# Patient Record
Sex: MALE | Race: WHITE | NOT HISPANIC OR LATINO | URBAN - METROPOLITAN AREA
[De-identification: names, ages, dates, MRNs, and addresses within clinical notes are randomized per-mention and may not be internally consistent; named-entity substitution may affect disease eponyms.]

---

## 2024-05-22 PROBLEM — S39.012A LUMBAR STRAIN: Status: ACTIVE | Noted: 2024-05-22

## 2024-05-22 PROBLEM — M54.9 DISCOGENIC PAIN: Status: ACTIVE | Noted: 2024-05-22

## 2024-05-22 PROBLEM — L40.9 PSORIASIS: Status: ACTIVE | Noted: 2024-05-22

## 2024-05-22 PROBLEM — B35.1 ONYCHOMYCOSIS: Status: ACTIVE | Noted: 2024-05-22

## 2024-05-22 PROBLEM — M54.42 ACUTE LEFT-SIDED LOW BACK PAIN WITH LEFT-SIDED SCIATICA: Status: ACTIVE | Noted: 2024-05-22

## 2024-05-22 PROBLEM — M67.439 GANGLION OF WRIST: Status: ACTIVE | Noted: 2024-05-22

## 2024-05-24 ENCOUNTER — APPOINTMENT (OUTPATIENT)
Dept: PRIMARY CARE | Facility: CLINIC | Age: 44
End: 2024-05-24
Payer: COMMERCIAL

## 2024-06-25 RX ORDER — APREMILAST 30 MG/1
30 TABLET, FILM COATED ORAL 2 TIMES DAILY
COMMUNITY
End: 2024-06-26 | Stop reason: WASHOUT

## 2024-06-26 ENCOUNTER — APPOINTMENT (OUTPATIENT)
Dept: PRIMARY CARE | Facility: CLINIC | Age: 44
End: 2024-06-26
Payer: COMMERCIAL

## 2024-06-26 VITALS
DIASTOLIC BLOOD PRESSURE: 72 MMHG | HEART RATE: 109 BPM | SYSTOLIC BLOOD PRESSURE: 140 MMHG | TEMPERATURE: 98 F | BODY MASS INDEX: 32.58 KG/M2 | WEIGHT: 220 LBS | HEIGHT: 69 IN

## 2024-06-26 DIAGNOSIS — M79.89 SOFT TISSUE MASS: Primary | ICD-10-CM

## 2024-06-26 PROCEDURE — 99213 OFFICE O/P EST LOW 20 MIN: CPT | Performed by: FAMILY MEDICINE

## 2024-06-26 RX ORDER — LIDOCAINE 50 MG/G
PATCH TOPICAL
COMMUNITY
Start: 2022-08-02 | End: 2024-06-26 | Stop reason: WASHOUT

## 2024-06-26 RX ORDER — METHOTREXATE 2.5 MG/1
TABLET ORAL
COMMUNITY
Start: 2024-05-28 | End: 2024-06-26 | Stop reason: WASHOUT

## 2024-06-26 RX ORDER — IXEKIZUMAB 80 MG/ML
INJECTION, SOLUTION SUBCUTANEOUS
COMMUNITY
Start: 2024-06-10

## 2024-06-26 RX ORDER — FLUOCINOLONE ACETONIDE 0.11 MG/ML
OIL AURICULAR (OTIC)
COMMUNITY
Start: 2023-10-05 | End: 2024-06-26 | Stop reason: WASHOUT

## 2024-06-26 RX ORDER — CLOBETASOL PROPIONATE 0.5 MG/G
OINTMENT TOPICAL
COMMUNITY
Start: 2023-10-05 | End: 2024-06-26 | Stop reason: WASHOUT

## 2024-06-26 RX ORDER — FOLIC ACID 1 MG/1
1 TABLET ORAL
COMMUNITY
Start: 2023-12-08 | End: 2024-06-26 | Stop reason: WASHOUT

## 2024-06-26 RX ORDER — TAPINAROF 10 MG/1000MG
CREAM TOPICAL
COMMUNITY
Start: 2023-10-06 | End: 2024-06-26 | Stop reason: WASHOUT

## 2024-06-26 RX ORDER — IBUPROFEN 800 MG/1
TABLET ORAL
COMMUNITY
Start: 2022-08-02 | End: 2024-06-26 | Stop reason: WASHOUT

## 2024-06-26 ASSESSMENT — PATIENT HEALTH QUESTIONNAIRE - PHQ9
1. LITTLE INTEREST OR PLEASURE IN DOING THINGS: NOT AT ALL
SUM OF ALL RESPONSES TO PHQ9 QUESTIONS 1 AND 2: 0
2. FEELING DOWN, DEPRESSED OR HOPELESS: NOT AT ALL

## 2024-06-26 NOTE — PROGRESS NOTES
"    /72   Pulse 109   Temp 36.7 °C (98 °F)   Ht 1.753 m (5' 9\")   Wt 99.8 kg (220 lb)   BMI 32.49 kg/m²     Past Medical History:   Diagnosis Date    Contact with and (suspected) exposure to covid-19     Exposure to COVID-19 virus    Contact with and (suspected) exposure to covid-19 12/19/2021    Encounter for laboratory testing for COVID-19 virus    Other specified health status     Patient denies medical problems    Personal history of other specified conditions 12/19/2021    History of nasal congestion       Patient Active Problem List   Diagnosis    Acute left-sided low back pain with left-sided sciatica    Discogenic pain    Ganglion of wrist    Lumbar strain    Onychomycosis    Psoriasis       Current Outpatient Medications   Medication Sig Dispense Refill    Taltz Autoinjector 80 mg/mL injection inject 80 mg Subcutaneous every 4 weeks for 84 days      clobetasol (Temovate) 0.05 % ointment APPLY THIN LAYER TO AFFECTED AREA 2X/DAY FOR ITCHING AS NEEDED.      fluocinolone (DermOtic) 0.01 % ear drops INSTILL 5 DROPS INTO AFFECTED EAR TWICE A DAY FOR 7 DAYS      folic acid (Folvite) 1 mg tablet Take 1 tablet (1 mg) by mouth early in the morning..      ibuprofen 800 mg tablet Take by mouth.      lidocaine (Lidoderm) 5 % patch APPLY 1 PATCH TO THE AFFECTED AREA AND LEAVE IN PLACE FOR 12 HOURS, THEN REMOVE AND LEAVE OFF FOR 12 HOURS.      methotrexate (Trexall) 2.5 mg tablet TAKE 8 TABLETS ORALLY ONCE A WEEK FOR 90 DAYS      Otezla 30 mg tablet Take 1 tablet (30 mg) by mouth 2 times a day.      Vtama 1 % cream        No current facility-administered medications for this visit.       CC/HPI/ASSESSMENT/PLAN    CC bump on chest    HPI patient 43-year-old male notes he has a lump on his midline in the lower rib cage area.  He notes the lump is gotten bigger and is somewhat painful.  He like evaluated.  Denies fever chills shortness of breath headache rash.    Exam calm neck supple lungs CTA CV RRR.  " Examination of the chest wall reveals a palpable lump in the midline of the chest in the lower rib cage region most likely a cyst    A/P 1 soft tissue mass.  X-ray and ultrasound ordered.  Will recommend further workup based on these test results.  Patient agreeable to plan.    There are no diagnoses linked to this encounter.

## 2024-06-28 ENCOUNTER — HOSPITAL ENCOUNTER (OUTPATIENT)
Dept: RADIOLOGY | Facility: CLINIC | Age: 44
Discharge: HOME | End: 2024-06-28
Payer: COMMERCIAL

## 2024-06-28 DIAGNOSIS — M79.89 SOFT TISSUE MASS: ICD-10-CM

## 2024-06-28 PROCEDURE — 74022 RADEX COMPL AQT ABD SERIES: CPT

## 2024-06-28 PROCEDURE — 76705 ECHO EXAM OF ABDOMEN: CPT

## 2024-07-01 ENCOUNTER — TELEPHONE (OUTPATIENT)
Dept: PRIMARY CARE | Facility: CLINIC | Age: 44
End: 2024-07-01
Payer: COMMERCIAL

## 2024-07-01 NOTE — TELEPHONE ENCOUNTER
----- Message from Ben Wall MD sent at 7/1/2024  9:53 AM EDT -----  Ultrasound shows imaging consistent with benign lipoma.  No further testing recommended

## 2024-07-10 PROBLEM — M79.89 SOFT TISSUE MASS: Status: ACTIVE | Noted: 2024-05-22
